# Patient Record
Sex: FEMALE | Race: ASIAN | NOT HISPANIC OR LATINO | URBAN - METROPOLITAN AREA
[De-identification: names, ages, dates, MRNs, and addresses within clinical notes are randomized per-mention and may not be internally consistent; named-entity substitution may affect disease eponyms.]

---

## 2020-08-16 ENCOUNTER — EMERGENCY (EMERGENCY)
Facility: HOSPITAL | Age: 60
LOS: 1 days | Discharge: HOME | End: 2020-08-16
Attending: EMERGENCY MEDICINE
Payer: MEDICAID

## 2020-08-16 VITALS
DIASTOLIC BLOOD PRESSURE: 90 MMHG | HEART RATE: 88 BPM | OXYGEN SATURATION: 98 % | WEIGHT: 139.99 LBS | HEIGHT: 63 IN | SYSTOLIC BLOOD PRESSURE: 148 MMHG | RESPIRATION RATE: 18 BRPM

## 2020-08-16 LAB
ALBUMIN SERPL ELPH-MCNC: 5.3 G/DL — HIGH (ref 3.3–5)
ALP SERPL-CCNC: 108 U/L — SIGNIFICANT CHANGE UP (ref 40–120)
ALT FLD-CCNC: 14 U/L — SIGNIFICANT CHANGE UP (ref 10–45)
ANION GAP SERPL CALC-SCNC: 13 MMOL/L — SIGNIFICANT CHANGE UP (ref 5–17)
APTT BLD: 28.9 SEC — SIGNIFICANT CHANGE UP (ref 27.5–35.5)
AST SERPL-CCNC: 18 U/L — SIGNIFICANT CHANGE UP (ref 10–40)
BASE EXCESS BLDV CALC-SCNC: 2.1 MMOL/L — HIGH (ref -2–2)
BASOPHILS # BLD AUTO: 0.04 K/UL — SIGNIFICANT CHANGE UP (ref 0–0.2)
BASOPHILS NFR BLD AUTO: 0.7 % — SIGNIFICANT CHANGE UP (ref 0–2)
BILIRUB SERPL-MCNC: 0.4 MG/DL — SIGNIFICANT CHANGE UP (ref 0.2–1.2)
BUN SERPL-MCNC: 18 MG/DL — SIGNIFICANT CHANGE UP (ref 7–23)
CA-I SERPL-SCNC: 1.22 MMOL/L — SIGNIFICANT CHANGE UP (ref 1.12–1.3)
CALCIUM SERPL-MCNC: 10 MG/DL — SIGNIFICANT CHANGE UP (ref 8.4–10.5)
CHLORIDE BLDV-SCNC: 109 MMOL/L — HIGH (ref 96–108)
CHLORIDE SERPL-SCNC: 105 MMOL/L — SIGNIFICANT CHANGE UP (ref 96–108)
CO2 BLDV-SCNC: 28 MMOL/L — SIGNIFICANT CHANGE UP (ref 22–30)
CO2 SERPL-SCNC: 25 MMOL/L — SIGNIFICANT CHANGE UP (ref 22–31)
CREAT SERPL-MCNC: 0.46 MG/DL — LOW (ref 0.5–1.3)
EOSINOPHIL # BLD AUTO: 0.15 K/UL — SIGNIFICANT CHANGE UP (ref 0–0.5)
EOSINOPHIL NFR BLD AUTO: 2.7 % — SIGNIFICANT CHANGE UP (ref 0–6)
GAS PNL BLDV: 143 MMOL/L — SIGNIFICANT CHANGE UP (ref 135–145)
GAS PNL BLDV: SIGNIFICANT CHANGE UP
GAS PNL BLDV: SIGNIFICANT CHANGE UP
GLUCOSE BLDV-MCNC: 112 MG/DL — HIGH (ref 70–99)
GLUCOSE SERPL-MCNC: 117 MG/DL — HIGH (ref 70–99)
HCO3 BLDV-SCNC: 27 MMOL/L — SIGNIFICANT CHANGE UP (ref 21–29)
HCT VFR BLD CALC: 39.1 % — SIGNIFICANT CHANGE UP (ref 34.5–45)
HCT VFR BLDA CALC: 43 % — SIGNIFICANT CHANGE UP (ref 39–50)
HGB BLD CALC-MCNC: 13.9 G/DL — SIGNIFICANT CHANGE UP (ref 11.5–15.5)
HGB BLD-MCNC: 13 G/DL — SIGNIFICANT CHANGE UP (ref 11.5–15.5)
IMM GRANULOCYTES NFR BLD AUTO: 0.4 % — SIGNIFICANT CHANGE UP (ref 0–1.5)
INR BLD: 0.87 RATIO — LOW (ref 0.88–1.16)
LACTATE BLDV-MCNC: 2.3 MMOL/L — HIGH (ref 0.7–2)
LYMPHOCYTES # BLD AUTO: 1.92 K/UL — SIGNIFICANT CHANGE UP (ref 1–3.3)
LYMPHOCYTES # BLD AUTO: 34.3 % — SIGNIFICANT CHANGE UP (ref 13–44)
MCHC RBC-ENTMCNC: 30 PG — SIGNIFICANT CHANGE UP (ref 27–34)
MCHC RBC-ENTMCNC: 33.2 GM/DL — SIGNIFICANT CHANGE UP (ref 32–36)
MCV RBC AUTO: 90.3 FL — SIGNIFICANT CHANGE UP (ref 80–100)
MONOCYTES # BLD AUTO: 0.35 K/UL — SIGNIFICANT CHANGE UP (ref 0–0.9)
MONOCYTES NFR BLD AUTO: 6.3 % — SIGNIFICANT CHANGE UP (ref 2–14)
NEUTROPHILS # BLD AUTO: 3.12 K/UL — SIGNIFICANT CHANGE UP (ref 1.8–7.4)
NEUTROPHILS NFR BLD AUTO: 55.6 % — SIGNIFICANT CHANGE UP (ref 43–77)
NRBC # BLD: 0 /100 WBCS — SIGNIFICANT CHANGE UP (ref 0–0)
PCO2 BLDV: 45 MMHG — SIGNIFICANT CHANGE UP (ref 35–50)
PH BLDV: 7.4 — SIGNIFICANT CHANGE UP (ref 7.35–7.45)
PLATELET # BLD AUTO: 262 K/UL — SIGNIFICANT CHANGE UP (ref 150–400)
PO2 BLDV: 60 MMHG — HIGH (ref 25–45)
POTASSIUM BLDV-SCNC: 4.1 MMOL/L — SIGNIFICANT CHANGE UP (ref 3.5–5.3)
POTASSIUM SERPL-MCNC: 3.7 MMOL/L — SIGNIFICANT CHANGE UP (ref 3.5–5.3)
POTASSIUM SERPL-SCNC: 3.7 MMOL/L — SIGNIFICANT CHANGE UP (ref 3.5–5.3)
PROT SERPL-MCNC: 7.2 G/DL — SIGNIFICANT CHANGE UP (ref 6–8.3)
PROTHROM AB SERPL-ACNC: 10.4 SEC — LOW (ref 10.6–13.6)
RBC # BLD: 4.33 M/UL — SIGNIFICANT CHANGE UP (ref 3.8–5.2)
RBC # FLD: 12.6 % — SIGNIFICANT CHANGE UP (ref 10.3–14.5)
SAO2 % BLDV: 90 % — HIGH (ref 67–88)
SODIUM SERPL-SCNC: 143 MMOL/L — SIGNIFICANT CHANGE UP (ref 135–145)
TROPONIN T, HIGH SENSITIVITY RESULT: <6 NG/L — SIGNIFICANT CHANGE UP (ref 0–51)
WBC # BLD: 5.6 K/UL — SIGNIFICANT CHANGE UP (ref 3.8–10.5)
WBC # FLD AUTO: 5.6 K/UL — SIGNIFICANT CHANGE UP (ref 3.8–10.5)

## 2020-08-16 PROCEDURE — 70498 CT ANGIOGRAPHY NECK: CPT | Mod: 26

## 2020-08-16 PROCEDURE — 70496 CT ANGIOGRAPHY HEAD: CPT | Mod: 26

## 2020-08-16 PROCEDURE — 99285 EMERGENCY DEPT VISIT HI MDM: CPT

## 2020-08-16 NOTE — CONSULT NOTE ADULT - ASSESSMENT
NOTE INCOMPLETE AT THIS TIME    mgmt to be discussed with Neurology Attending Dr. Wick Assessment: 59yo RH woman with no pmh takes aspirin for primary prevention of CAD presenting to [facility] ED for altered mental status with Neurology consult for such. Neurological examination significant for + corrective saccade upon head impulse test and + kaleigh hallpike. CT/CTA read pending.     Impression: headache with room spinning dizziness 2/2 ddx including: vestibular migraine, BPPV  questionable change in mental status may be due to global hypoperfusion in the setting of possible underlying vessel stenosis versus known hypotension versus seizure    Recommendations:  NOTE INCOMPLETE AT THIS TIME    mgmt to be discussed with Neurology Attending Dr. Wick Assessment: 61yo RH woman with no pmh takes aspirin for primary prevention of CAD presenting to [facility] ED for altered mental status with Neurology consult for such. Neurological examination significant for + corrective saccade upon head impulse test and + kaleigh hallpike. CT/CTA read pending.     Impression: headache with room spinning dizziness 2/2 ddx including: vestibular migraine, BPPV  questionable change in mental status may be due to global hypoperfusion in the setting of possible underlying vessel stenosis versus known hypotension versus seizure    after discussion with family, clinically patient appears to have had TGA versus TIA with ABCD2 3 (low risk for stroke)    Recommendations:  [ ] f/u CT/CTA final read  [ ] no further neurological workup inpatient  [ ] patient can follow up with [facility]  4th floor [place], [place]  [phone]    mgmt to be discussed with Neurology Attending Dr. Wick; d/w family at length Assessment: 59yo RH woman with no pmh takes aspirin for primary prevention of CAD presenting to [facility] ED for altered mental status with Neurology consult for such. Neurological examination significant for + corrective saccade upon head impulse test and + kaleigh hallpike. CT/CTA read pending. Patient back to baseline so not a tpa candidate. No LVO on CTA so not a candidate for mechanical thrombectomy.    Impression: headache with room spinning dizziness 2/2 ddx including: vestibular migraine, BPPV  questionable change in mental status may be due to global hypoperfusion in the setting of possible underlying vessel stenosis versus known hypotension versus seizure    after discussion with family, clinically patient appears to have had TGA versus TIA with ABCD2 3 (low risk for stroke)    Recommendations:  [ ] f/u CT/CTA final read  [ ] no further neurological workup inpatient  [ ] patient can follow up with [facility]  4th floor [place], [place]  [phone]    mgmt to be discussed with Neurology Attending Dr. Wick; d/w family at length Assessment: 59yo RH woman with no pmh takes aspirin for primary prevention of CAD presenting to [facility] ED for altered mental status with Neurology consult for such. Neurological examination significant for + corrective saccade upon head impulse test and + kaleigh hallpike. CT/CTA read pending. Patient back to baseline so not a tpa candidate. No LVO on CTA so not a candidate for mechanical thrombectomy.    Impression: headache with room spinning dizziness 2/2 ddx including: vestibular migraine, BPPV  questionable change in mental status may be due to global hypoperfusion in the setting of possible underlying vessel stenosis versus known hypotension versus seizure    after discussion with family, clinically patient appears to have had TGA versus TIA with ABCD2 3 (low risk for stroke)    Recommendations:  [ ] f/u CT/CTA final read  [ ] no further neurological workup inpatient  [ ] c/w aspirin home med  [ ] patient can follow up with [facility]  4th floor [place], [place]  [phone]    mgmt to be discussed with Neurology Attending Dr. Wick; d/w family at length Assessment: 59yo RH woman with no pmh takes aspirin for primary prevention of CAD presenting to [facility] ED for altered mental status with Neurology consult for such. Neurological examination significant for + corrective saccade upon head impulse test and + kaleigh hallpike. CT/CTA read pending. Patient back to baseline so not a tpa candidate. No LVO on CTA so not a candidate for mechanical thrombectomy.    Impression: headache with room spinning dizziness 2/2 ddx including: vestibular migraine, BPPV  questionable change in mental status may be due to global hypoperfusion in the setting of possible underlying vessel stenosis versus known hypotension versus seizure    after discussion with family, clinically patient appears to have had TGA versus TIA with ABCD2 3 (low risk for stroke)    Recommendations:  [ ] f/u CT/CTA final read  [ ] no further neurological workup inpatient  [ ] c/w aspirin home med  [ ] meclizine 25mg (MDD 100mg) for dizziness/vestibular migraine  [ ] patient can follow up with [facility]  4th floor [place], [place]  [phone]    mgmt to be discussed with Neurology Attending Dr. Wick; d/w family at length

## 2020-08-16 NOTE — ED ADULT NURSE NOTE - NSIMPLEMENTINTERV_GEN_ALL_ED
Implemented All Universal Safety Interventions:  [place] to call system. Call bell, personal items and telephone within reach. Instruct patient to call for assistance. Room bathroom lighting operational. Non-slip footwear when patient is off stretcher. Physically safe environment: no spills, clutter or unnecessary equipment. Stretcher in lowest position, wheels locked, appropriate side rails in place.

## 2020-08-16 NOTE — ED PROVIDER NOTE - RAPID ASSESSMENT
Dr. Hakw Note: saw patient on EMS arrival...hx from EMS, patient through interepreter.  Pt with headache, memory loss, improving symptoms. No diplopia, dysphagia, dysarthria, ataxia, focal weakness in arm or leg. Exam unremarkable.   per EMS.  Does not meet criteria for code stroke at this time. Dr. Hawk Note: saw patient on EMS arrival...hx from EMS, patient through .  Pt with headache, memory loss, improving symptoms. No diplopia, dysphagia, dysarthria, ataxia, focal weakness in arm or leg. Exam unremarkable.   per EMS.  Does not meet criteria for code stroke at this time.

## 2020-08-16 NOTE — ED PROVIDER NOTE - NS ED ROS FT
GENERAL: no fever, chills  HEENT: no cough, congestion, odynophagia, dysphagia  CARDIAC: no chest pain, palpitations, lightheadedness  PULM: no dyspnea, wheezing   GI: no abdominal pain, nausea, vomiting, diarrhea, constipation, melena, hematochezia  : no urinary dysuria, frequency, incontinence, hematuria  NEURO: (+) headache, motor weakness, sensory changes  MSK: no joint or muscle pain  SKIN: no rashes, hives  HEME: no active bleeding, bruising

## 2020-08-16 NOTE — ED PROVIDER NOTE - PATIENT PORTAL LINK FT
You can access the FollowMyHealth Patient Portal offered by [facility] by registering at the following website: [email or web address]. By joining [employer]’s FollowMyHealth portal, you will also be able to view your health information using other applications (apps) compatible with our system.

## 2020-08-16 NOTE — CONSULT NOTE ADULT - SUBJECTIVE AND OBJECTIVE BOX
HPI: 61yo RH woman with no pmh not on antithrombotics presenting to [facility] ED for altered mental status with Neurology consult for such. Patient speaks Mandarin and [employer]  [id number] and [ ] was used for encounter. Patient reports that she felt sudden onset headache on top of her head and dizziness when [ ] with [ ] hearing loss, tinnitus, [ ] loss of consciousness, head or neck trauma, nausea, vomiting, [ ] tongue biting, urinary incontinence, [ ] history of headaches, no medications, [ ] history of smoking, alcohol use.     (Stroke only)  NIHSS: 0  MRS: 0    REVIEW OF SYSTEMS    A 10-system ROS was performed and is negative except for those items noted above and/or in the HPI.    PAST MEDICAL & SURGICAL HISTORY:    FAMILY HISTORY:    SOCIAL HISTORY:   T/E/D:   Occupation:   Lives with:     MEDICATIONS (HOME):  Home Medications:    MEDICATIONS  (STANDING):    MEDICATIONS  (PRN):    ALLERGIES/INTOLERANCES:  Allergies  No Known Allergies    VITALS & EXAMINATION:  Vital Signs Last 24 Hrs  T(C): 36.7 (16 Aug 2020 21:52), Max: 36.7 (16 Aug 2020 21:52)  T(F): 98 (16 Aug 2020 21:52), Max: 98 (16 Aug 2020 21:52)  HR: 81 (16 Aug 2020 21:52) (81 - 88)  BP: 143/82 (16 Aug 2020 21:52) (143/82 - 148/90)  BP(mean): --  RR: 18 (16 Aug 2020 21:52) (18 - 18)  SpO2: 99% (16 Aug 2020 21:52) (98% - 99%)    Neurological (>12):  MS: Awake, alert, oriented to person, place, situation, time. Normal affect. Follows all commands.    Language: Speech is clear, fluent with good repetition & comprehension (able to name objects___)    CNs: PERRL (R = 3mm, L = 3mm). VFF. EOMI no nystagmus. V1-3 intact to LT/pinprick, well developed masseter muscles b/l. No facial asymmetry b/l, full eye closure strength b/l. Hearing grossly normal (rubbing fingers) b/l. Symmetric palate elevation in midline. Gag reflex deferred. Head turning & shoulder shrug intact b/l. Tongue midline, normal movements, no atrophy.    Motor: Normal muscle bulk & tone. No noticeable tremor. No pronator drift.              Deltoid	Biceps	Triceps	Wrist	Finger ABd	   R	5	5	5	5	5		5 	  L	5	5	5	5	5		5    	H-Flex	H-Ext	H-ABd	H-ADd	K-Flex	K-Ext	D-Flex	P-Flex  R	5	5	5	5	5	5	5	5 	   L	5	5	5	5	5	5	5	5	     Sensation: Intact to LT b/l throughout.     Cortical: Extinction on DSS (neglect): none    Reflexes:              Biceps(C5)       BR(C6)     Triceps(C7)               Patellar(L4)    Achilles(S1)    Plantar Resp  R	2	          2	             2		        2		    2		Down   L	2	          2	             2		        2		    2		Down     Coordination: intact rapid-alt movements. No dysmetria to FTN    Gait: No postural instability. Normal stance and tandem gait.     LABORATORY:  CBC                       13.0   5.60  )-----------( 262      ( 16 Aug 2020 22:49 )             39.1     Chem 08-16    143  |  105  |  18  ----------------------------<  117<H>  3.7   |  25  |  0.46<L>    Ca    10.0      16 Aug 2020 22:49    TPro  7.2  /  Alb  5.3<H>  /  TBili  0.4  /  DBili  x   /  AST  18  /  ALT  14  /  AlkPhos  108  08-16    LFTs LIVER FUNCTIONS - ( 16 Aug 2020 22:49 )  Alb: 5.3 g/dL / Pro: 7.2 g/dL / ALK PHOS: 108 U/L / ALT: 14 U/L / AST: 18 U/L / GGT: x           Coagulopathy PT/INR - ( 16 Aug 2020 22:49 )   PT: 10.4 sec;   INR: 0.87 ratio      PTT - ( 16 Aug 2020 22:49 )  PTT:28.9 sec    STUDIES & IMAGING:  Studies (EKG, EEG, EMG, etc):     Radiology (XR, CT, MR, U/S, TTE/TAMANNA):    pending CT/CTA HPI: 59yo RH woman with no pmh takes aspirin for primary prevention of CAD presenting to [facility] ED for altered mental status with Neurology consult for such. Patient speaks Mandarin and [employer] [id number] and [id number] were used for encounter. Patient reports that she felt sudden onset headache on top of her head and dizziness with no associated hearing loss, tinnitus, no loss of consciousness, head or neck trauma, nausea, vomiting, tongue biting, urinary incontinence, reports history of headaches with associated vomiting, takes no medications, no history of smoking, social alcohol use. Denies family reporting any arm or leg shaking. She reported the event lasted about 10 minutes. The  during the examination noted that the patient would not completely answer the question and would answer with perseverating statements "I am feeling better now, I had headache and dizziness before but I am fine now". She reported at that time having numbness in the left hand and left leg, which she reports has improved but not fully gone away, and report currently having some headache at top of her head. She says for the past 2 years she has had headaches with sometimes associated vomiting. She does not completely remember the event but does not think she lost consciousness. She reports she has had intermittent tinnitus with the headaches, but at this time she did not have tinnitus. reports fhx of father having a brain aneurysm and hypertension. mother is old but otherwise has no medical problems. reports having issue of low blood pressure in the past.     (Stroke only)  NIHSS: 0  MRS: 0    REVIEW OF SYSTEMS    A 10-system ROS was performed and is negative except for those items noted above and/or in the HPI.    MEDICATIONS (HOME):  Home Medications:    MEDICATIONS  (STANDING):    MEDICATIONS  (PRN):    ALLERGIES/INTOLERANCES:  Allergies  No Known Allergies    VITALS & EXAMINATION:  Vital Signs Last 24 Hrs  T(C): 36.7 (16 Aug 2020 21:52), Max: 36.7 (16 Aug 2020 21:52)  T(F): 98 (16 Aug 2020 21:52), Max: 98 (16 Aug 2020 21:52)  HR: 81 (16 Aug 2020 21:52) (81 - 88)  BP: 143/82 (16 Aug 2020 21:52) (143/82 - 148/90)  BP(mean): --  RR: 18 (16 Aug 2020 21:52) (18 - 18)  SpO2: 99% (16 Aug 2020 21:52) (98% - 99%)    Neurological (>12):  MS: Awake, alert, oriented to person, place, situation, time. Normal affect. Follows all commands.    Language: Speech is clear, fluent with good repetition & comprehension    CNs: PERRL (R = 3mm, L = 3mm). VFF. EOMI no nystagmus. V1-3 intact to LT/pinprick, well developed masseter muscles b/l. No facial asymmetry b/l, full eye closure strength b/l. Hearing grossly normal (rubbing fingers) b/l. Symmetric palate elevation in midline. Gag reflex deferred. Head turning & shoulder shrug intact b/l. Tongue midline, normal movements, no atrophy.    HINTS: + corrective saccade upon head impulse, no nystagmus, negative test of skew    + [place] Hallpike when patient had head turned to the left    Motor: Normal muscle bulk & tone. No noticeable tremor. No pronator drift. No neck stiffness.               Deltoid	Biceps	Triceps	Wrist	Finger ABd	   R	5	5	5	5	5		5 	  L	5	5	5	5	5		5    	H-Flex	H-Ext	H-ABd	H-ADd	K-Flex	K-Ext	D-Flex	P-Flex  R	5	5	5	5	5	5	5	5 	   L	5	5	5	5	5	5	5	5	     Sensation: Intact to LT b/l throughout.     Cortical: Extinction on DSS (neglect): none    Reflexes:              Biceps(C5)       BR(C6)     Triceps(C7)               Patellar(L4)    Achilles(S1)    Plantar Resp  R	2	          2	             2		        2		    2		Down   L	2	          2	             2		        2		    2		Down     Coordination: intact rapid-alt movements. No dysmetria to FTN    Gait: No postural instability. Normal stance and tandem gait.     LABORATORY:  CBC                       13.0   5.60  )-----------( 262      ( 16 Aug 2020 22:49 )             39.1     Chem 08-16    143  |  105  |  18  ----------------------------<  117<H>  3.7   |  25  |  0.46<L>    Ca    10.0      16 Aug 2020 22:49    TPro  7.2  /  Alb  5.3<H>  /  TBili  0.4  /  DBili  x   /  AST  18  /  ALT  14  /  AlkPhos  108  08-16    LFTs LIVER FUNCTIONS - ( 16 Aug 2020 22:49 )  Alb: 5.3 g/dL / Pro: 7.2 g/dL / ALK PHOS: 108 U/L / ALT: 14 U/L / AST: 18 U/L / GGT: x           Coagulopathy PT/INR - ( 16 Aug 2020 22:49 )   PT: 10.4 sec;   INR: 0.87 ratio      PTT - ( 16 Aug 2020 22:49 )  PTT:28.9 sec    STUDIES & IMAGING:  Studies (EKG, EEG, EMG, etc):     Radiology (XR, CT, MR, U/S, TTE/TAMANNA):    pending CT/CTA read HPI: 61yo RH woman with no pmh takes aspirin for primary prevention of CAD presenting to [facility] ED for altered mental status with Neurology consult for such. Patient speaks Mandarin and [employer] [id number] and [id number] were used for encounter. Patient reports that she felt sudden onset headache on top of her head and dizziness with no associated hearing loss, tinnitus, no loss of consciousness, head or neck trauma, nausea, vomiting, tongue biting, urinary incontinence, reports history of headaches with associated vomiting, takes no medications, no history of smoking, social alcohol use. Denies family reporting any arm or leg shaking. She reported the event lasted about 10 minutes. The  during the examination noted that the patient would not completely answer the question and would answer with perseverating statements "I am feeling better now, I had headache and dizziness before but I am fine now". She reported at that time having numbness in the left hand and left leg, which she reports has improved but not fully gone away, and report currently having some headache at top of her head. She says for the past 2 years she has had headaches with sometimes associated vomiting. She does not completely remember the event but does not think she lost consciousness. She reports she has had intermittent tinnitus with the headaches, but at this time she did not have tinnitus. reports fhx of father having a brain aneurysm and hypertension. mother is old but otherwise has no medical problems. reports having issue of low blood pressure in the past.     Per family on the phone:   She sang a song at some party and then she felt headache after singing two karaoke songs. She then asked family "who am I, who are you, where are we?" and family was concerned she had transiently "lost her memory" for about 15 minutes, when she went back home and she recognized her grandchild but she was not completely back to baseline, but could not remember all of what she ate at the party last night. No loss of consciousness, no urinary incontinence, no tongue biting, this has never happened before.     (Stroke only)  NIHSS: 0  MRS: 0    REVIEW OF SYSTEMS    A 10-system ROS was performed and is negative except for those items noted above and/or in the HPI.    MEDICATIONS (HOME):  Home Medications:    MEDICATIONS  (STANDING):    MEDICATIONS  (PRN):    ALLERGIES/INTOLERANCES:  Allergies  No Known Allergies    VITALS & EXAMINATION:  Vital Signs Last 24 Hrs  T(C): 36.7 (16 Aug 2020 21:52), Max: 36.7 (16 Aug 2020 21:52)  T(F): 98 (16 Aug 2020 21:52), Max: 98 (16 Aug 2020 21:52)  HR: 81 (16 Aug 2020 21:52) (81 - 88)  BP: 143/82 (16 Aug 2020 21:52) (143/82 - 148/90)  BP(mean): --  RR: 18 (16 Aug 2020 21:52) (18 - 18)  SpO2: 99% (16 Aug 2020 21:52) (98% - 99%)    Neurological (>12):  MS: Awake, alert, oriented to person, place, situation, time. Normal affect. Follows all commands.    Language: Speech is clear, fluent with good repetition & comprehension    CNs: PERRL (R = 3mm, L = 3mm). VFF. EOMI no nystagmus. V1-3 intact to LT/pinprick, well developed masseter muscles b/l. No facial asymmetry b/l, full eye closure strength b/l. Hearing grossly normal (rubbing fingers) b/l. Symmetric palate elevation in midline. Gag reflex deferred. Head turning & shoulder shrug intact b/l. Tongue midline, normal movements, no atrophy.    HINTS: + corrective saccade upon head impulse, no nystagmus, negative test of skew    + [place] Hallpike when patient had head turned to the left    Motor: Normal muscle bulk & tone. No noticeable tremor. No pronator drift. No neck stiffness.               Deltoid	Biceps	Triceps	Wrist	Finger ABd	   R	5	5	5	5	5		5 	  L	5	5	5	5	5		5    	H-Flex	H-Ext	H-ABd	H-ADd	K-Flex	K-Ext	D-Flex	P-Flex  R	5	5	5	5	5	5	5	5 	   L	5	5	5	5	5	5	5	5	     Sensation: Intact to LT b/l throughout.     Cortical: Extinction on DSS (neglect): none    Reflexes:              Biceps(C5)       BR(C6)     Triceps(C7)               Patellar(L4)    Achilles(S1)    Plantar Resp  R	2	          2	             2		        2		    2		Down   L	2	          2	             2		        2		    2		Down     Coordination: intact rapid-alt movements. No dysmetria to FTN    Gait: No postural instability. Normal stance and tandem gait.     LABORATORY:  CBC                       13.0   5.60  )-----------( 262      ( 16 Aug 2020 22:49 )             39.1     Chem 08-16    143  |  105  |  18  ----------------------------<  117<H>  3.7   |  25  |  0.46<L>    Ca    10.0      16 Aug 2020 22:49    TPro  7.2  /  Alb  5.3<H>  /  TBili  0.4  /  DBili  x   /  AST  18  /  ALT  14  /  AlkPhos  108  08-16    LFTs LIVER FUNCTIONS - ( 16 Aug 2020 22:49 )  Alb: 5.3 g/dL / Pro: 7.2 g/dL / ALK PHOS: 108 U/L / ALT: 14 U/L / AST: 18 U/L / GGT: x           Coagulopathy PT/INR - ( 16 Aug 2020 22:49 )   PT: 10.4 sec;   INR: 0.87 ratio      PTT - ( 16 Aug 2020 22:49 )  PTT:28.9 sec    STUDIES & IMAGING:  Studies (EKG, EEG, EMG, etc):     Radiology (XR, CT, MR, U/S, TTE/TAMANNA):    pending CT/CTA read

## 2020-08-16 NOTE — ED PROVIDER NOTE - PROGRESS NOTE DETAILS
Neurology consulted. Agreed to see patient. Talked to Dr. [place] Attending MD Casey: Patient seen by neurology, DDx TGA vs TIA, patient back at baseline, pending CTH/CTA read, if nonactionable, patient can be discharged with outpatient neuro follow up.  Continue home meds.  Patient signed out to Dr. Alcazar pending CT reads. Dayami Alcazar MD pt signed out to me by Dr. Casey, CTH negative CTA negative updated neuro w/ findings, state OK to dc home w/ meclizine as needed for possible vestibular h/a. I spoke w/ pt via  phone [phone] Qvbt, states that she feels improved no worsening vision changes, no chest pain, no weakness on one side, no numbness. pt requesting dc papers

## 2020-08-16 NOTE — ED PROVIDER NOTE - CLINICAL SUMMARY MEDICAL DECISION MAKING FREE TEXT BOX
61 yo F with no reported pmhx BIBEMS with AMS. She was with friends before she suddenly became altered. She denies seizures or head trauma. She admits to associated headache. VSS. PE is benign. 5/5 strength in all extremities. cranial nerves are grossly intact.     r/o TIA/intracranial pathology

## 2020-08-16 NOTE — ED PROVIDER NOTE - PHYSICAL EXAMINATION
GENERAL: no acute distress, non-toxic appearing, AAOx4 understands why she is in hospital  HEAD: normocephalic, atraumatic    HEENT: normal conjunctiva, oral mucosa moist, neck supple, no tongue lacerations    CARDIAC: regular rate and rhythm, normal S1 and S2,  no appreciable murmurs  PULM: clear to ascultation bilaterally, no crackles, rales, rhonchi, or wheezing  GI: abdomen nondistended, soft, nontender, no guarding or rebound tenderness  : no CVA tenderness, no suprapubic tenderness  NEURO: alert and oriented x 3, normal speech, PERRLA, EOMI, no focal motor or sensory deficits  MSK: no visible deformities, no peripheral edema, calf tenderness/redness/swelling  SKIN: no visible rashes, dry, well-perfused  PSYCH: appropriate mood and affect

## 2020-08-16 NOTE — ED ADULT NURSE NOTE - OBJECTIVE STATEMENT
59 Yo female primarily mandarin speaking BIBEMS for AMS. approx 1 hour ago, Patent was at a party and developed sudden severe HA and confusion. Prior to EMS call, patient was A&OX2, now patient A&OX3. Patient on daily aspirin. 61 Yo female primarily mandarin speaking BIBEMS for AMS. Approx 1 hour ago, PT was at a party and developed sudden severe HA and confusion. Prior to EMS call, patient was A&OX2, now patient A&OX3. States she was drinking " a little bit". Patient on daily aspirin. Denies LOC, vision changes, or fall. Complaining of numbness and weakness of hands. States headache improved from onset- HA is dull, and memory is returning slowly. Neuro assessment- pupils 3mm, round reactive, equal sensations bilaterally, full ROM of extremities Denies SOB, chest pain, fever, chills, cough, N/V/D, dysuria. Interventions- side rails up, call bell at bedside. IV- 20 RT hand.

## 2020-08-16 NOTE — ED ADULT TRIAGE NOTE - CHIEF COMPLAINT QUOTE
alerted mental status starting today 1 hour ago, pt states "I cant remember anything"  pt reports 7/10 headache

## 2020-08-16 NOTE — ED PROVIDER NOTE - OBJECTIVE STATEMENT
59 yo F with no reported pmhx BIBEMS with AMS. She was with friends before she suddenly became altered. Friends states that patient became confused and AAOx0. She was not aware of self, place or year. Family denies any seizures or trauma. Patient states that she had an episode of headache when the confusion occurred and has since resolved.  At time of examination, she is AAOx4 and able to answer questions. She admit to headaches. She denies any CP, SOB, abdominal pain, urinary symptoms, fevers, chills, N/V/D.

## 2020-08-16 NOTE — ED PROVIDER NOTE - NSFOLLOWUPINSTRUCTIONS_ED_ALL_ED_FT
-You were seen in the Emergency Department (ED) for Altered Mental Status. Lab and imaging results, if performed, were discussed with you along with your discharge diagnosis.    CONTINUE with aspirin home med  PLEASE take meclizine 25mg (MDD 100mg) for dizziness  PLEASE follow up with [facility]  4th floor [place] [phone]    ????????????  ???25 mg(MDD 100mg)??????  ????????????    [address] [place] [phone]    PAIN CONTROL:  -Please take over the counter Tylenol (also known as acetaminophen) 650mg every 6 hours or Ibuprofen (also known as motrin, advil) 600mg every 8 hour for your pain, IF ANY, unless you are not supposed to for any reason.  -Rest, stay hydrated with plenty of fluids (drink at least 2 Liters or 64 Ounces of water each day UNLESS you are supposed to restrict fluids or ANY reason.      RETURN PRECAUTIONS:  -Please return to the Emergency Department if you experience ANY new or concerning symptoms, such as, but not limited to: worsening pain, large amount of bleeding, passing out, fever >100.F, shaking chills, inability to see or new double vision, chest pain, difficulty breathing, diffuse abdominal pain, unable to eat or drink, continuous vomiting or diarrhea, unable to move or feel part of your body -You were seen in the Emergency Department (ED) for Altered Mental Status. Lab and imaging results, if performed, were discussed with you along with your discharge diagnosis.    CONTINUE with aspirin home med  PLEASE take meclizine 25mg (MDD 100mg) for dizziness  PLEASE follow up with [facility]  4th floor [place], [place]  [phone]      PAIN CONTROL:  -Please take over the counter Tylenol (also known as acetaminophen) 650mg every 6 hours or Ibuprofen (also known as motrin, advil) 600mg every 8 hour for your pain, IF ANY, unless you are not supposed to for any reason.  -Rest, stay hydrated with plenty of fluids (drink at least 2 Liters or 64 Ounces of water each day UNLESS you are supposed to restrict fluids or ANY reason.      RETURN PRECAUTIONS:  -Please return to the Emergency Department if you experience ANY new or concerning symptoms, such as, but not limited to: worsening pain, large amount of bleeding, passing out, fever >100.F, shaking chills, inability to see or new double vision, chest pain, difficulty breathing, diffuse abdominal pain, unable to eat or drink, continuous vomiting or diarrhea, unable to move or feel part of your body -You were seen in the Emergency Department (ED) for Altered Mental Status. Lab and imaging results, if performed, were discussed with you along with your discharge diagnosis.    CONTINUE with aspirin home med  PLEASE take meclizine 25mg (MDD 100mg) for dizziness  PLEASE follow up with [facility]  4th floor [place], [place]  [phone]    Your Medication is at the following pharmacy:  [facility]  [address] [place] [address]      PAIN CONTROL:  -Please take over the counter Tylenol (also known as acetaminophen) 650mg every 6 hours or Ibuprofen (also known as motrin, advil) 600mg every 8 hour for your pain, IF ANY, unless you are not supposed to for any reason.  -Rest, stay hydrated with plenty of fluids (drink at least 2 Liters or 64 Ounces of water each day UNLESS you are supposed to restrict fluids or ANY reason.      RETURN PRECAUTIONS:  -Please return to the Emergency Department if you experience ANY new or concerning symptoms, such as, but not limited to: worsening pain, large amount of bleeding, passing out, fever >100.F, shaking chills, inability to see or new double vision, chest pain, difficulty breathing, diffuse abdominal pain, unable to eat or drink, continuous vomiting or diarrhea, unable to move or feel part of your body

## 2020-08-16 NOTE — ED ADULT NURSE REASSESSMENT NOTE - NS ED NURSE REASSESS COMMENT FT1
A/O X3. PT states has HA but decreased in pain. Numbness of hands resolved and able to move all ten fingers. Use of  Ipad- number [id number]. A/O X3. PT states has HA but decreased in pain. Numbness of hands resolved and able to move all ten fingers. PT asking repetative questions- asked "I am staying her over night?".

## 2020-08-17 VITALS
TEMPERATURE: 98 F | RESPIRATION RATE: 18 BRPM | HEART RATE: 81 BPM | OXYGEN SATURATION: 98 % | DIASTOLIC BLOOD PRESSURE: 75 MMHG | SYSTOLIC BLOOD PRESSURE: 126 MMHG

## 2020-08-17 LAB
APPEARANCE UR: CLEAR — SIGNIFICANT CHANGE UP
BACTERIA # UR AUTO: NEGATIVE — SIGNIFICANT CHANGE UP
BILIRUB UR-MCNC: NEGATIVE — SIGNIFICANT CHANGE UP
COLOR SPEC: COLORLESS — SIGNIFICANT CHANGE UP
DIFF PNL FLD: NEGATIVE — SIGNIFICANT CHANGE UP
EPI CELLS # UR: 2 /HPF — SIGNIFICANT CHANGE UP
ETHANOL SERPL-MCNC: SIGNIFICANT CHANGE UP MG/DL (ref 0–10)
GLUCOSE UR QL: NEGATIVE — SIGNIFICANT CHANGE UP
HYALINE CASTS # UR AUTO: 1 /LPF — SIGNIFICANT CHANGE UP (ref 0–2)
KETONES UR-MCNC: NEGATIVE — SIGNIFICANT CHANGE UP
LEUKOCYTE ESTERASE UR-ACNC: ABNORMAL
NITRITE UR-MCNC: NEGATIVE — SIGNIFICANT CHANGE UP
PH UR: 7.5 — SIGNIFICANT CHANGE UP (ref 5–8)
PROT UR-MCNC: SIGNIFICANT CHANGE UP
RBC CASTS # UR COMP ASSIST: 3 /HPF — SIGNIFICANT CHANGE UP (ref 0–4)
SARS-COV-2 RNA SPEC QL NAA+PROBE: SIGNIFICANT CHANGE UP
SP GR SPEC: 1.05 — HIGH (ref 1.01–1.02)
UROBILINOGEN FLD QL: NEGATIVE — SIGNIFICANT CHANGE UP
WBC UR QL: 10 /HPF — HIGH (ref 0–5)

## 2020-08-17 PROCEDURE — 82947 ASSAY GLUCOSE BLOOD QUANT: CPT

## 2020-08-17 PROCEDURE — 80053 COMPREHEN METABOLIC PANEL: CPT

## 2020-08-17 PROCEDURE — 84484 ASSAY OF TROPONIN QUANT: CPT

## 2020-08-17 PROCEDURE — 99284 EMERGENCY DEPT VISIT MOD MDM: CPT | Mod: 25

## 2020-08-17 PROCEDURE — 85730 THROMBOPLASTIN TIME PARTIAL: CPT

## 2020-08-17 PROCEDURE — 82330 ASSAY OF CALCIUM: CPT

## 2020-08-17 PROCEDURE — 82435 ASSAY OF BLOOD CHLORIDE: CPT

## 2020-08-17 PROCEDURE — 84295 ASSAY OF SERUM SODIUM: CPT

## 2020-08-17 PROCEDURE — 80307 DRUG TEST PRSMV CHEM ANLYZR: CPT

## 2020-08-17 PROCEDURE — 85610 PROTHROMBIN TIME: CPT

## 2020-08-17 PROCEDURE — 70450 CT HEAD/BRAIN W/O DYE: CPT

## 2020-08-17 PROCEDURE — 70498 CT ANGIOGRAPHY NECK: CPT

## 2020-08-17 PROCEDURE — 82803 BLOOD GASES ANY COMBINATION: CPT

## 2020-08-17 PROCEDURE — 83605 ASSAY OF LACTIC ACID: CPT

## 2020-08-17 PROCEDURE — 85014 HEMATOCRIT: CPT

## 2020-08-17 PROCEDURE — 70496 CT ANGIOGRAPHY HEAD: CPT

## 2020-08-17 PROCEDURE — 84132 ASSAY OF SERUM POTASSIUM: CPT

## 2020-08-17 PROCEDURE — 81001 URINALYSIS AUTO W/SCOPE: CPT

## 2020-08-17 PROCEDURE — 85027 COMPLETE CBC AUTOMATED: CPT

## 2020-08-17 RX ORDER — MECLIZINE HCL 12.5 MG
1 TABLET ORAL
Qty: 20 | Refills: 0
Start: 2020-08-17 | End: 2020-09-05

## 2020-08-17 NOTE — ED ADULT NURSE REASSESSMENT NOTE - NS ED NURSE REASSESS COMMENT FT1
PT A/O x3. PT stable. ED Resident Mayco Shetty contacting son to update son on PT condition and to go over discharge instructions and proper follow ups.

## 2020-08-19 LAB — DRUG SCREEN, SERUM: SIGNIFICANT CHANGE UP

## 2022-11-04 NOTE — ED PROVIDER NOTE - CARE PLAN
Population Health new Social Work referral     note:    received referral from RN.  Per RN referral assessment below, the following needs were identified and referred to  for follow up.    Per [facility], looking for additional help at home, meal options and [employer] benefits.    Added self to care team.      will reach out to patient in: 5-7 days  
Principal Discharge DX:	Altered behavior